# Patient Record
Sex: FEMALE | URBAN - METROPOLITAN AREA
[De-identification: names, ages, dates, MRNs, and addresses within clinical notes are randomized per-mention and may not be internally consistent; named-entity substitution may affect disease eponyms.]

---

## 2018-01-31 ENCOUNTER — COMPLETE SKIN EXAM (OUTPATIENT)
Dept: URBAN - METROPOLITAN AREA CLINIC 19 | Facility: CLINIC | Age: 63
Setting detail: DERMATOLOGY
End: 2018-01-31

## 2018-01-31 DIAGNOSIS — L30.1 DYSHIDROSIS [POMPHOLYX]: ICD-10-CM

## 2018-01-31 DIAGNOSIS — L91.0 HYPERTROPHIC SCAR: ICD-10-CM

## 2018-01-31 PROBLEM — D18.01 HEMANGIOMA OF SKIN AND SUBCUTANEOUS TISSUE: Status: RESOLVED | Noted: 2018-01-31

## 2018-01-31 PROBLEM — L82.1 OTHER SEBORRHEIC KERATOSIS: Status: RESOLVED | Noted: 2018-01-31

## 2018-01-31 PROBLEM — L60.3 NAIL DYSTROPHY: Status: RESOLVED | Noted: 2018-01-31

## 2018-01-31 PROBLEM — L21.8 OTHER SEBORRHEIC DERMATITIS: Status: RESOLVED | Noted: 2018-01-31

## 2018-01-31 PROCEDURE — 99203 OFFICE O/P NEW LOW 30 MIN: CPT

## 2018-01-31 RX ORDER — POLY-UREAURETHANE 16 %
1 ADD'L SIG NAIL FILM SOLUTION (ML) TOPICAL DAILY
Qty: 15 | Refills: 2 | Status: DISCONTINUED
Start: 2018-01-31 | End: 2018-02-08

## 2018-02-08 ENCOUNTER — RX ONLY (RX ONLY)
Age: 63
End: 2018-02-08

## 2018-02-08 RX ORDER — POLY-UREAURETHANE 16 %
1 ADD'L SIG NAIL FILM SOLUTION (ML) TOPICAL DAILY
Qty: 15 | Refills: 2 | Status: DISCONTINUED
Start: 2018-02-08 | End: 2018-07-05

## 2018-02-08 RX ORDER — POLY-UREAURETHANE 16 %
1 ML NAIL FILM SOLUTION (ML) TOPICAL DAILY
Qty: 15 | Refills: 2
Start: 2018-02-08

## 2018-02-08 RX ORDER — POLY-UREAURETHANE 16 %
1 ADD'L SIG NAIL FILM SOLUTION (ML) TOPICAL DAILY
Qty: 15 | Refills: 2 | Status: DISCONTINUED
Start: 2018-02-08 | End: 2018-02-08

## 2018-02-28 ENCOUNTER — FOLLOW-UP (OUTPATIENT)
Dept: URBAN - METROPOLITAN AREA CLINIC 19 | Facility: CLINIC | Age: 63
Setting detail: DERMATOLOGY
End: 2018-02-28

## 2018-02-28 PROBLEM — L60.3 NAIL DYSTROPHY: Status: RESOLVED | Noted: 2018-02-28

## 2018-02-28 PROCEDURE — 99213 OFFICE O/P EST LOW 20 MIN: CPT

## 2018-02-28 RX ORDER — IVERMECTIN 10 MG/G
1 APPLICATION CREAM TOPICAL DAILY
Qty: 45 | Refills: 3
Start: 2018-02-28

## 2023-05-17 ENCOUNTER — APPOINTMENT (OUTPATIENT)
Dept: URBAN - METROPOLITAN AREA SURGERY 12 | Age: 68
Setting detail: DERMATOLOGY
End: 2023-05-22

## 2023-05-17 DIAGNOSIS — L03.01 CELLULITIS OF FINGER: ICD-10-CM

## 2023-05-17 PROBLEM — L08.9 LOCAL INFECTION OF THE SKIN AND SUBCUTANEOUS TISSUE, UNSPECIFIED: Status: ACTIVE | Noted: 2023-05-17

## 2023-05-17 PROCEDURE — OTHER ADDITIONAL NOTES: OTHER

## 2023-05-17 PROCEDURE — OTHER BIOPSY BY SHAVE METHOD: OTHER

## 2023-05-17 PROCEDURE — 11102 TANGNTL BX SKIN SINGLE LES: CPT

## 2023-05-17 ASSESSMENT — LOCATION DETAILED DESCRIPTION DERM: LOCATION DETAILED: PERIUNGUAL SKIN LEFT THUMB

## 2023-05-17 ASSESSMENT — LOCATION SIMPLE DESCRIPTION DERM: LOCATION SIMPLE: LEFT THUMB

## 2023-05-17 ASSESSMENT — LOCATION ZONE DERM: LOCATION ZONE: FINGER

## 2023-05-17 NOTE — HPI: NAIL DISORDER
Is This A New Presentation, Or A Follow-Up?: Nail Disorder
How Severe Is It?: mild
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 Patient had started oral and topical antibiotic and notices improvement. Patient is currently taking valacyclovir.

## 2023-05-17 NOTE — PROCEDURE: ADDITIONAL NOTES
Detail Level: Simple
Additional Notes: Right 3rd digit, left 4th digit and left thumb are involved. Unable to perform viral culture - however discussed with patient that results may be skewed due to taking valacyclovir daily. Patient has been avoiding contact with others to prevent spread. Advised patient to keep sites covered and keep extremity elevated post biopsy. Complete course of cephalexin and continue Mupirocin to all affected nails BID. Offered topical steroid, patient declines and states she has an OTC steroid cream - OK to apply BID for no longer than 2 weeks PRN itch. Will notify patient of biopsy results and follow up accordingly.  Also discussed Tzanck smear if new active lesion occurs and we have supplies in office to get sample if bx results inconclusive
Render Risk Assessment In Note?: no

## 2023-06-06 ENCOUNTER — RX ONLY (RX ONLY)
Age: 68
End: 2023-06-06

## 2023-06-06 RX ORDER — GENTAMICIN SULFATE 1 MG/G
CREAM TOPICAL
Qty: 30 | Refills: 1 | Status: ERX | COMMUNITY
Start: 2023-06-06

## 2023-06-06 RX ORDER — CLOTRIMAZOLE 1 G/100G
CREAM TOPICAL
Qty: 30 | Refills: 1 | Status: ERX | COMMUNITY
Start: 2023-06-06

## 2023-06-06 RX ORDER — HYDROCORTISONE 25 MG/G
CREAM TOPICAL
Qty: 30 | Refills: 1 | Status: ERX | COMMUNITY
Start: 2023-06-06